# Patient Record
Sex: MALE | Race: BLACK OR AFRICAN AMERICAN | Employment: FULL TIME | URBAN - METROPOLITAN AREA
[De-identification: names, ages, dates, MRNs, and addresses within clinical notes are randomized per-mention and may not be internally consistent; named-entity substitution may affect disease eponyms.]

---

## 2018-08-21 ENCOUNTER — HOSPITAL ENCOUNTER (EMERGENCY)
Age: 28
Discharge: HOME | End: 2018-08-21
Attending: EMERGENCY MEDICINE
Payer: SELF-PAY

## 2018-08-21 ENCOUNTER — APPOINTMENT (OUTPATIENT)
Dept: GENERAL RADIOLOGY | Age: 28
End: 2018-08-21
Attending: EMERGENCY MEDICINE
Payer: SELF-PAY

## 2018-08-21 VITALS
HEIGHT: 71 IN | DIASTOLIC BLOOD PRESSURE: 84 MMHG | WEIGHT: 152 LBS | SYSTOLIC BLOOD PRESSURE: 132 MMHG | HEART RATE: 84 BPM | BODY MASS INDEX: 21.28 KG/M2 | RESPIRATION RATE: 18 BRPM | TEMPERATURE: 98 F | OXYGEN SATURATION: 100 %
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
## 2018-08-21 DIAGNOSIS — S20.212A CONTUSION OF LEFT CHEST WALL, INITIAL ENCOUNTER: Primary | ICD-10-CM

## 2018-08-21 DIAGNOSIS — S39.012A STRAIN OF LUMBAR REGION, INITIAL ENCOUNTER: ICD-10-CM

## 2018-08-21 PROCEDURE — 72100 X-RAY EXAM L-S SPINE 2/3 VWS: CPT

## 2018-08-21 PROCEDURE — 99283 EMERGENCY DEPT VISIT LOW MDM: CPT | Performed by: EMERGENCY MEDICINE

## 2018-08-21 PROCEDURE — 71046 X-RAY EXAM CHEST 2 VIEWS: CPT

## 2018-08-21 PROCEDURE — 93005 ELECTROCARDIOGRAM TRACING: CPT | Performed by: EMERGENCY MEDICINE

## 2018-08-21 RX ORDER — TRAMADOL HYDROCHLORIDE 50 MG/1
100 TABLET ORAL
Qty: 19 TAB | Refills: 0 | Status: SHIPPED | OUTPATIENT
Start: 2018-08-21

## 2018-08-21 RX ORDER — METHOCARBAMOL 750 MG/1
1500 TABLET, FILM COATED ORAL 4 TIMES DAILY
Qty: 40 TAB | Refills: 0 | Status: SHIPPED | OUTPATIENT
Start: 2018-08-21

## 2018-08-21 RX ORDER — NAPROXEN 500 MG/1
500 TABLET ORAL 2 TIMES DAILY WITH MEALS
Qty: 20 TAB | Refills: 0 | Status: SHIPPED | OUTPATIENT
Start: 2018-08-21
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
## 2018-08-22 LAB
ATRIAL RATE: 85 BPM
CALCULATED P AXIS, ECG09: 66 DEGREES
CALCULATED R AXIS, ECG10: 91 DEGREES
CALCULATED T AXIS, ECG11: 56 DEGREES
DIAGNOSIS, 93000: NORMAL
P-R INTERVAL, ECG05: 168 MS
Q-T INTERVAL, ECG07: 358 MS
QRS DURATION, ECG06: 102 MS
QTC CALCULATION (BEZET), ECG08: 426 MS
VENTRICULAR RATE, ECG03: 85 BPM

## 2018-08-22 NOTE — ED NOTES
I have reviewed discharge instructions with the patient. The patient verbalized understanding. Patient left ED via Discharge Method: ambulatory to Home with self. Opportunity for questions and clarification provided. Patient given 3 scripts. To continue your aftercare when you leave the hospital, you may receive an automated call from our care team to check in on how you are doing. This is a free service and part of our promise to provide the best care and service to meet your aftercare needs.  If you have questions, or wish to unsubscribe from this service please call [phone]. Thank you for Choosing our [facility] Emergency Department.
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
## 2021-02-02 ENCOUNTER — APPOINTMENT (OUTPATIENT)
Dept: CT IMAGING | Age: 31
End: 2021-02-02
Attending: EMERGENCY MEDICINE

## 2021-02-02 ENCOUNTER — HOSPITAL ENCOUNTER (EMERGENCY)
Age: 31
Discharge: HOME | End: 2021-02-02
Attending: EMERGENCY MEDICINE

## 2021-02-02 VITALS
DIASTOLIC BLOOD PRESSURE: 76 MMHG | SYSTOLIC BLOOD PRESSURE: 113 MMHG | RESPIRATION RATE: 16 BRPM | OXYGEN SATURATION: 100 % | HEART RATE: 82 BPM | TEMPERATURE: 98.6 F

## 2021-02-02 DIAGNOSIS — H93.13 TINNITUS OF BOTH EARS: ICD-10-CM

## 2021-02-02 DIAGNOSIS — H91.93 BILATERAL HEARING LOSS, UNSPECIFIED HEARING LOSS TYPE: Primary | ICD-10-CM

## 2021-02-02 DIAGNOSIS — R74.01 TRANSAMINITIS: ICD-10-CM

## 2021-02-02 LAB
ALBUMIN SERPL-MCNC: 3.6 G/DL (ref 3.5–5)
ALBUMIN/GLOB SERPL: 0.7 {RATIO} (ref 1.2–3.5)
ALP SERPL-CCNC: 224 U/L (ref 50–136)
ALT SERPL-CCNC: 513 U/L (ref 12–65)
ANION GAP SERPL CALC-SCNC: 8 MMOL/L (ref 7–16)
APAP SERPL-MCNC: <10 UG/ML (ref 10–30)
AST SERPL-CCNC: 2712 U/L (ref 15–37)
BASOPHILS # BLD: 0 K/UL (ref 0–0.2)
BASOPHILS NFR BLD: 0 % (ref 0–2)
BILIRUB SERPL-MCNC: 0.3 MG/DL (ref 0.2–1.1)
BUN SERPL-MCNC: 18 MG/DL (ref 6–23)
CALCIUM SERPL-MCNC: 9 MG/DL (ref 8.3–10.4)
CHLORIDE SERPL-SCNC: 100 MMOL/L (ref 98–107)
CO2 SERPL-SCNC: 27 MMOL/L (ref 21–32)
CREAT SERPL-MCNC: 1.31 MG/DL (ref 0.8–1.5)
DIFFERENTIAL METHOD BLD: ABNORMAL
EOSINOPHIL # BLD: 0 K/UL (ref 0–0.8)
EOSINOPHIL NFR BLD: 0 % (ref 0.5–7.8)
ERYTHROCYTE [DISTWIDTH] IN BLOOD BY AUTOMATED COUNT: 12.3 % (ref 11.9–14.6)
GLOBULIN SER CALC-MCNC: 5.4 G/DL (ref 2.3–3.5)
GLUCOSE SERPL-MCNC: 89 MG/DL (ref 65–100)
HCT VFR BLD AUTO: 40.3 % (ref 41.1–50.3)
HGB BLD-MCNC: 13.3 G/DL (ref 13.6–17.2)
IMM GRANULOCYTES # BLD AUTO: 0 K/UL (ref 0–0.5)
IMM GRANULOCYTES NFR BLD AUTO: 0 % (ref 0–5)
LYMPHOCYTES # BLD: 1.5 K/UL (ref 0.5–4.6)
LYMPHOCYTES NFR BLD: 17 % (ref 13–44)
MCH RBC QN AUTO: 29.8 PG (ref 26.1–32.9)
MCHC RBC AUTO-ENTMCNC: 33 G/DL (ref 31.4–35)
MCV RBC AUTO: 90.4 FL (ref 79.6–97.8)
MONOCYTES # BLD: 0.6 K/UL (ref 0.1–1.3)
MONOCYTES NFR BLD: 7 % (ref 4–12)
NEUTS SEG # BLD: 6.8 K/UL (ref 1.7–8.2)
NEUTS SEG NFR BLD: 76 % (ref 43–78)
NRBC # BLD: 0 K/UL (ref 0–0.2)
PLATELET # BLD AUTO: 279 K/UL (ref 150–450)
PMV BLD AUTO: 9.1 FL (ref 9.4–12.3)
POTASSIUM SERPL-SCNC: 4.7 MMOL/L (ref 3.5–5.1)
PROT SERPL-MCNC: 9 G/DL (ref 6.3–8.2)
RBC # BLD AUTO: 4.46 M/UL (ref 4.23–5.6)
SALICYLATES SERPL-MCNC: 2.4 MG/DL (ref 2.8–20)
SODIUM SERPL-SCNC: 135 MMOL/L (ref 136–145)
TSH SERPL DL<=0.005 MIU/L-ACNC: 1.86 UIU/ML (ref 0.36–3.74)
WBC # BLD AUTO: 9 K/UL (ref 4.3–11.1)

## 2021-02-02 PROCEDURE — 70450 CT HEAD/BRAIN W/O DYE: CPT

## 2021-02-02 PROCEDURE — 99283 EMERGENCY DEPT VISIT LOW MDM: CPT

## 2021-02-02 PROCEDURE — 85025 COMPLETE CBC W/AUTO DIFF WBC: CPT

## 2021-02-02 PROCEDURE — 84443 ASSAY THYROID STIM HORMONE: CPT

## 2021-02-02 PROCEDURE — 80179 DRUG ASSAY SALICYLATE: CPT

## 2021-02-02 PROCEDURE — 80143 DRUG ASSAY ACETAMINOPHEN: CPT

## 2021-02-02 PROCEDURE — 80053 COMPREHEN METABOLIC PANEL: CPT

## 2021-02-02 NOTE — LETTER
[address] [facility] EMERGENCY DEPT 
[address] [facility] 
[phone] Work/School Note Date: 2/2/2021 To Whom It May concern: 
 
Daniella Tinajero was seen and treated today in the emergency room by the following provider(s): 
Attending Provider: Shivam Hernandez MD. Trini Newman was with patient for duration of care.  Patient was discharged 02/02/21 @ 10:51pm 
 
Sincerely, 
 
 
 
 
Citlali Decker RN

## 2021-02-02 NOTE — LETTER
[facility] EMERGENCY DEPT 
[address] [facility] [place] [facility] [address] 
[phone] Work/School Note Date: 2/2/2021 To Whom It May concern: 
 
Daniella Tinajero was seen and treated today in the emergency room by the following provider(s): 
Attending Provider: Shivam Hernandez MD.   
 
Daniella Tinajero may return to work on 02/04/2021.  
 
Sincerely, 
 
 
 
 
Citlali Decker RN

## 2021-02-03 NOTE — ED NOTES
I have reviewed discharge instructions with the patient and significant other. The patient and significant other verbalized understanding. Patient left ED via Discharge Method: ambulatory to Home with significant other    Opportunity for questions and clarification provided. Patient given 0 scripts. To continue your aftercare when you leave the hospital, you may receive an automated call from our care team to check in on how you are doing. This is a free service and part of our promise to provide the best care and service to meet your aftercare needs.  If you have questions, or wish to unsubscribe from this service please call [phone]. Thank you for Choosing our [facility] Emergency Department.

## 2021-02-03 NOTE — DISCHARGE INSTRUCTIONS
Your liver function enzymes were elevated. This could possibly be secondary to your daily alcohol use or recent heavy use of NSAIDs such as Aleve or ibuprofen. You need to follow-up with Dr. Author Hinson at his office on tomorrow for further evaluation by audiologist and Dr. Author Hinson. Return to emergency department if symptoms worsen or progress in any way. Refrain from drinking alcohol or taking excessive amounts of NSAIDs. Return to emergency department symptoms worsen or progress in any way.

## 2021-02-03 NOTE — ED PROVIDER NOTES
21-year-old male with no significant past medical history presents with his girlfriend with complaint of inability to hear out of bilateral ears. States that symptoms started sometime last evening. States sometime around midnight to 2 AM.  States that he has high pitched ringing in bilateral ears. Denies facial droop, slurred speech, headache, ear pain, ear drainage, nausea, vomiting, fever, chills, neck pain, focal weakness. States that he works at [employer] and is around loud heavy machinery daily. States he does not wear ear protection regularly. Patient states that around 2 to 3 weeks ago he had nasal congestion, fever and chills and was tested for COVID-19. Reports negative for COVID-19. States symptoms resolved. Nurse states that upon patient presenting to triage girlfriend patient was not with him and that she communicated with him and he responded appropriately as if he could hear her without difficulty. Patient's girlfriend in room helping provide information. The history is provided by the patient. History limited by: Patient sent here but answers some questions appropriately. Communicating via text message. No  was used. No past medical history on file. Past Surgical History:   Procedure Laterality Date    HX APPENDECTOMY           No family history on file.     Social History     Socioeconomic History    Marital status: SINGLE     Spouse name: Not on file    Number of children: Not on file    Years of education: Not on file    Highest education level: Not on file   Occupational History    Not on file   Social Needs    Financial resource strain: Not on file    Food insecurity     Worry: Not on file     Inability: Not on file    Transportation needs     Medical: Not on file     Non-medical: Not on file   Tobacco Use    Smoking status: Current Every Day Smoker     Packs/day: 0.50    Smokeless tobacco: Never Used   Substance and Sexual Activity  Alcohol use: Yes     Comment: Socially    Drug use: No    Sexual activity: Not on file   Lifestyle    Physical activity     Days per week: Not on file     Minutes per session: Not on file    Stress: Not on file   Relationships    Social connections     Talks on phone: Not on file     Gets together: Not on file     Attends Sikhism service: Not on file     Active member of club or organization: Not on file     Attends meetings of clubs or organizations: Not on file     Relationship status: Not on file    Intimate partner violence     Fear of current or ex partner: Not on file     Emotionally abused: Not on file     Physically abused: Not on file     Forced sexual activity: Not on file   Other Topics Concern    Not on file   Social History Narrative    Not on file         ALLERGIES: Patient has no known allergies. Review of Systems   Constitutional: Negative for chills, diaphoresis, fatigue and fever. HENT: Positive for congestion and hearing loss. Negative for ear discharge, ear pain, facial swelling, sore throat, trouble swallowing and voice change. Eyes: Negative for photophobia, pain and visual disturbance. Respiratory: Negative for cough and shortness of breath. Cardiovascular: Negative for chest pain. Gastrointestinal: Negative for abdominal pain, diarrhea, nausea and vomiting. Genitourinary: Negative for dysuria and flank pain. Musculoskeletal: Negative for gait problem, neck pain and neck stiffness. Skin: Negative for rash and wound. Neurological: Negative for dizziness, speech difficulty, weakness, light-headedness, numbness and headaches. Psychiatric/Behavioral: Negative for confusion. Vitals:    02/02/21 1946   BP: 117/75   Pulse: 95   Resp: 16   Temp: 98.6 °F (37 °C)   SpO2: 96%            Physical Exam  Vitals signs and nursing note reviewed. Constitutional:       Appearance: Normal appearance. HENT:      Head: Normocephalic and atraumatic.       Comments: Patient unable to hear out of bilateral ears. No mastoid process TTP. Right Ear: Tympanic membrane, ear canal and external ear normal. There is no impacted cerumen. Left Ear: Tympanic membrane, ear canal and external ear normal. There is no impacted cerumen. Ears:      Comments: Ear canals clear bilaterally. No impacted cerumen. Mild bulging of bilateral TMs     Mouth/Throat:      Mouth: Mucous membranes are moist.   Eyes:      Extraocular Movements: Extraocular movements intact. Conjunctiva/sclera: Conjunctivae normal.      Pupils: Pupils are equal, round, and reactive to light. Neck:      Musculoskeletal: Normal range of motion. No neck rigidity. Comments: No nuchal rigidity. Cardiovascular:      Rate and Rhythm: Normal rate and regular rhythm. Pulses: Normal pulses. Heart sounds: Normal heart sounds. Pulmonary:      Effort: Pulmonary effort is normal.      Breath sounds: Normal breath sounds. Abdominal:      Palpations: Abdomen is soft. Tenderness: There is no abdominal tenderness. There is no guarding. Musculoskeletal: Normal range of motion. Skin:     General: Skin is warm. Neurological:      General: No focal deficit present. Mental Status: He is alert and oriented to person, place, and time. Motor: No weakness. Comments: Strength 5 out of 5 throughout. No facial droop. No dysarthria, no aphasia. Normal sensory exam. No drift. MDM  Number of Diagnoses or Management Options  Bilateral hearing loss, unspecified hearing loss type: new and requires workup  Tinnitus of both ears: new and requires workup  Transaminitis: new and requires workup  Diagnosis management comments: Patient's girlfriend states that patient has been having left lower dental pain over the past 1 to 2 weeks and has noticed that he completed a bottle of Aleve. States that she thinks that there may be 20 pills in the bottle.   States the patient also drinks around 2-3 40 ounce beers daily. States not on any other medications. Consulted Dr. Anival Telles. States patient can be seen in office tomorrow and seen by audiologist. Given bilateral concern for possible malingering. CT head negative. Given normal neuro exam no indication for urgent MRI. Labs unremarkable w/ exception of elevated LFTs. ASA and Acetaminophen levels normal.  Discussed further with Dr. Anival Telles given elevated LFTs and concern for tinnitus 2/2 NSAID abuse/toxicity. States possible. But that meds usually give a dose dependent tinnitus so you get it when taking the meds bit goes away as they leave your system. Recommends discharge because he really needs an audiogram for us to monitor his progress. Instructed to refrain from drinking alcohol or taking any NSAIDs. Instructed to follow-up with Dr. Veloz Care office in the morning for further evaluation by audiologist.  Given strict return precautions.         Amount and/or Complexity of Data Reviewed  Clinical lab tests: ordered and reviewed  Tests in the radiology section of CPT®: ordered and reviewed  Tests in the medicine section of CPT®: ordered and reviewed  Review and summarize past medical records: yes  Independent visualization of images, tracings, or specimens: yes    Risk of Complications, Morbidity, and/or Mortality  Presenting problems: moderate  Diagnostic procedures: low  Management options: moderate    Patient Progress  Patient progress: stable    ED Course as of Feb 02 2250 Tue Feb 02, 2021 2055 CT head IMPRESSION: No CT evidence of acute intracranial abnormality.       [DF]   2138 AST(!): 2,712 [DF]   2142 ALT(!): 513 [DF]      ED Course User Index  [DF] Misty Singh MD       Procedures        Results Include:    Recent Results (from the past 24 hour(s))   CBC WITH AUTOMATED DIFF    Collection Time: 02/02/21  8:29 PM   Result Value Ref Range    WBC 9.0 4.3 - 11.1 K/uL    RBC 4.46 4.23 - 5.6 M/uL    HGB 13.3 (L) 13.6 - 17.2 g/dL HCT 40.3 (L) 41.1 - 50.3 %    MCV 90.4 79.6 - 97.8 FL    MCH 29.8 26.1 - 32.9 PG    MCHC 33.0 31.4 - 35.0 g/dL    RDW 12.3 11.9 - 14.6 %    PLATELET 651 315 - 893 K/uL    MPV 9.1 (L) 9.4 - 12.3 FL    ABSOLUTE NRBC 0.00 0.0 - 0.2 K/uL    DF AUTOMATED      NEUTROPHILS 76 43 - 78 %    LYMPHOCYTES 17 13 - 44 %    MONOCYTES 7 4.0 - 12.0 %    EOSINOPHILS 0 (L) 0.5 - 7.8 %    BASOPHILS 0 0.0 - 2.0 %    IMMATURE GRANULOCYTES 0 0.0 - 5.0 %    ABS. NEUTROPHILS 6.8 1.7 - 8.2 K/UL    ABS. LYMPHOCYTES 1.5 0.5 - 4.6 K/UL    ABS. MONOCYTES 0.6 0.1 - 1.3 K/UL    ABS. EOSINOPHILS 0.0 0.0 - 0.8 K/UL    ABS. BASOPHILS 0.0 0.0 - 0.2 K/UL    ABS. IMM. GRANS. 0.0 0.0 - 0.5 K/UL   METABOLIC PANEL, COMPREHENSIVE    Collection Time: 02/02/21  8:29 PM   Result Value Ref Range    Sodium 135 (L) 136 - 145 mmol/L    Potassium 4.7 3.5 - 5.1 mmol/L    Chloride 100 98 - 107 mmol/L    CO2 27 21 - 32 mmol/L    Anion gap 8 7 - 16 mmol/L    Glucose 89 65 - 100 mg/dL    BUN 18 6 - 23 MG/DL    Creatinine 1.31 0.8 - 1.5 MG/DL    GFR est AA >60 >60 ml/min/1.73m2    GFR est non-AA >60 >60 ml/min/1.73m2    Calcium 9.0 8.3 - 10.4 MG/DL    Bilirubin, total 0.3 0.2 - 1.1 MG/DL    ALT (SGPT) 513 (H) 12 - 65 U/L    AST (SGOT) 2,712 (H) 15 - 37 U/L    Alk. phosphatase 224 (H) 50 - 136 U/L    Protein, total 9.0 (H) 6.3 - 8.2 g/dL    Albumin 3.6 3.5 - 5.0 g/dL    Globulin 5.4 (H) 2.3 - 3.5 g/dL    A-G Ratio 0.7 (L) 1.2 - 3.5     SALICYLATE    Collection Time: 02/02/21  8:29 PM   Result Value Ref Range    Salicylate level 2.4 (L) 2.8 - 20.0 MG/DL   ACETAMINOPHEN    Collection Time: 02/02/21  8:29 PM   Result Value Ref Range    Acetaminophen level <10 (L) 10.0 - 30.0 ug/mL   TSH 3RD GENERATION    Collection Time: 02/02/21  8:29 PM   Result Value Ref Range    TSH 1.860 0.358 - 3.740 uIU/mL                Oneil Wray MD; 2/2/2021 @8:40 PM Voice dictation software was used during the making of this note.   This software is not perfect and grammatical and other typographical errors may be present.   This note has not been proofread for errors.  ===================================================================

## 2021-02-03 NOTE — ED TRIAGE NOTES
Patient arrives to ED pov from home. Patient's girlfriend states that since last night patient is unable to hear out of both ears. Patient went to doctor's care and they stated they did not see anything. Patient denies trauma or getting anything in his ears. Patient following commands in triage.